# Patient Record
Sex: FEMALE | Race: AMERICAN INDIAN OR ALASKA NATIVE | NOT HISPANIC OR LATINO | URBAN - NONMETROPOLITAN AREA
[De-identification: names, ages, dates, MRNs, and addresses within clinical notes are randomized per-mention and may not be internally consistent; named-entity substitution may affect disease eponyms.]

---

## 2023-06-19 ENCOUNTER — OFFICE VISIT (OUTPATIENT)
Dept: FAMILY MEDICINE | Facility: OTHER | Age: 58
End: 2023-06-19
Payer: MEDICAID

## 2023-06-19 VITALS
HEART RATE: 71 BPM | TEMPERATURE: 97.9 F | HEIGHT: 66 IN | WEIGHT: 170.6 LBS | RESPIRATION RATE: 20 BRPM | SYSTOLIC BLOOD PRESSURE: 158 MMHG | BODY MASS INDEX: 27.42 KG/M2 | OXYGEN SATURATION: 98 % | DIASTOLIC BLOOD PRESSURE: 98 MMHG

## 2023-06-19 DIAGNOSIS — L03.115 CELLULITIS OF RIGHT LOWER EXTREMITY: Primary | ICD-10-CM

## 2023-06-19 PROCEDURE — 99203 OFFICE O/P NEW LOW 30 MIN: CPT | Performed by: NURSE PRACTITIONER

## 2023-06-19 PROCEDURE — G0463 HOSPITAL OUTPT CLINIC VISIT: HCPCS

## 2023-06-19 RX ORDER — SULFAMETHOXAZOLE/TRIMETHOPRIM 800-160 MG
1 TABLET ORAL 2 TIMES DAILY
Qty: 20 TABLET | Refills: 0 | Status: SHIPPED | OUTPATIENT
Start: 2023-06-19 | End: 2023-06-29

## 2023-06-19 ASSESSMENT — PAIN SCALES - GENERAL: PAINLEVEL: MODERATE PAIN (5)

## 2023-06-19 NOTE — NURSING NOTE
Patient is here for bug bite on right lower leg. Noticed Friday, has been getting worse since.     Elise Beverly LPN .............6/19/2023     11:25 AM

## 2023-06-19 NOTE — PATIENT INSTRUCTIONS
Bactrim twice daily for 10 days.     If you develop a fever, nausea with vomiting, feeling like the infection is generally worsening and not improving then I would recommend you be re-evaluated more urgently.

## 2023-06-19 NOTE — PROGRESS NOTES
ASSESSMENT/PLAN:  I have reviewed the nursing notes.  I have reviewed the findings, diagnosis, plan and need for follow up with the patient.    1. Cellulitis of right lower extremity  - sulfamethoxazole-trimethoprim (BACTRIM DS) 800-160 MG tablet; Take 1 tablet by mouth 2 times daily for 10 days  Dispense: 20 tablet; Refill: 0  To cover for possible MRSA at site of infection. Is currently at [facility].   - May use over-the-counter Tylenol or ibuprofen PRN    Discussed warning signs/symptoms indicative of need to f/u    Follow up if symptoms persist or worsen or concerns    I explained my diagnostic considerations and recommendations to the patient, who voiced understanding and agreement with the treatment plan. All questions were answered. We discussed potential side effects of any prescribed or recommended therapies, as well as expectations for response to treatments.    Mellisa Blevins NP  6/19/2023  11:31 AM    HPI:  Mariella Conway is a 58 year old female who presents to [facility] today for concerns of right lower leg bug bite, getting worse. This happened on Friday, the site was itchy, so she scratched at it. Did not remove a tick. Uncertain what got her. Possibly a spider bite. It is draining a bit of clear/orange drainage today. Has diabetes. Has not reacted like this before. The site is really sore/tender. Does not feel ill. No fevers. It is throbbing up her leg now too. Causing slight swelling as well.   Has not had a lot of primary care over the years.   She is at [facility] for alcohol abuse. Has been there since March 28th, this is her third treatment plan.     ROS otherwise negative.     No past medical history on file.  No past surgical history on file.  Social History     Tobacco Use     Smoking status: Every Day     Packs/day: 0.25     Types: Cigarettes     Passive exposure: Current     Smokeless tobacco: Never   Vaping Use     Vaping status: Some Days     Substances: Nicotine  "  Substance Use Topics     Alcohol use: Not on file     No current outpatient medications on file.     Allergies   Allergen Reactions     Acetaminophen Other (See Comments)     Liver     Butorphanol Headache     Codeine Headache     Meperidine Headache     Past medical history, past surgical history, current medications and allergies reviewed and accurate to the best of my knowledge.      ROS:  Refer to HPI    BP (!) 158/98   Pulse 71   Temp 97.9  F (36.6  C) (Tympanic)   Resp 20   Ht 1.676 m (5' 6\")   Wt 77.4 kg (170 lb 9.6 oz)   LMP  (LMP Unknown)   SpO2 98%   Breastfeeding No   BMI 27.54 kg/m      EXAM:  General Appearance: Well appearing 58 year old female, appropriate appearance for age. No acute distress   Respiratory: normal chest wall and respirations.  Normal effort.  Clear to auscultation bilaterally, no wheezing, crackles or rhonchi.  No increased work of breathing.  No cough appreciated.  Cardiac: RRR with no murmurs  Musculoskeletal:  Equal movement of bilateral upper extremities.  Equal movement of bilateral lower extremities.  Normal gait.    Dermatological: right lower extremity: erythematous, warmth, and tenderness surrounding insect bite site. There is small amount of serous drainage observed. Approximately 5 cm in diameter.   Psychological: normal affect, alert, oriented, and pleasant.         "

## 2023-06-21 ENCOUNTER — OFFICE VISIT (OUTPATIENT)
Dept: INTERNAL MEDICINE | Facility: OTHER | Age: 58
End: 2023-06-21
Payer: MEDICAID

## 2023-06-21 VITALS
WEIGHT: 168.13 LBS | HEART RATE: 64 BPM | TEMPERATURE: 97.2 F | SYSTOLIC BLOOD PRESSURE: 138 MMHG | RESPIRATION RATE: 20 BRPM | HEIGHT: 67 IN | DIASTOLIC BLOOD PRESSURE: 84 MMHG | OXYGEN SATURATION: 97 % | BODY MASS INDEX: 26.39 KG/M2

## 2023-06-21 DIAGNOSIS — L03.90 CELLULITIS, UNSPECIFIED CELLULITIS SITE: Primary | ICD-10-CM

## 2023-06-21 DIAGNOSIS — Z23 NEED FOR TDAP VACCINATION: ICD-10-CM

## 2023-06-21 DIAGNOSIS — G47.00 INSOMNIA, UNSPECIFIED TYPE: ICD-10-CM

## 2023-06-21 PROCEDURE — 87077 CULTURE AEROBIC IDENTIFY: CPT | Mod: ZL

## 2023-06-21 PROCEDURE — 99213 OFFICE O/P EST LOW 20 MIN: CPT

## 2023-06-21 PROCEDURE — 90715 TDAP VACCINE 7 YRS/> IM: CPT

## 2023-06-21 PROCEDURE — G0463 HOSPITAL OUTPT CLINIC VISIT: HCPCS

## 2023-06-21 PROCEDURE — 90471 IMMUNIZATION ADMIN: CPT

## 2023-06-21 PROCEDURE — 250N000013 HC RX MED GY IP 250 OP 250 PS 637

## 2023-06-21 PROCEDURE — G0463 HOSPITAL OUTPT CLINIC VISIT: HCPCS | Mod: 25

## 2023-06-21 RX ORDER — RAMELTEON 8 MG/1
TABLET, FILM COATED ORAL
COMMUNITY
Start: 2023-06-07

## 2023-06-21 RX ORDER — ACETAMINOPHEN 325 MG/1
TABLET ORAL
COMMUNITY
Start: 2022-10-27

## 2023-06-21 RX ORDER — ACETAMINOPHEN 500 MG
500 TABLET ORAL EVERY 4 HOURS PRN
Status: ACTIVE | OUTPATIENT
Start: 2023-06-21

## 2023-06-21 RX ORDER — TRAZODONE HYDROCHLORIDE 50 MG/1
50 TABLET, FILM COATED ORAL AT BEDTIME
Qty: 10 TABLET | Refills: 0 | Status: SHIPPED | OUTPATIENT
Start: 2023-06-21

## 2023-06-21 RX ORDER — MUPIROCIN 20 MG/G
OINTMENT TOPICAL 3 TIMES DAILY
Qty: 30 G | Refills: 0 | Status: SHIPPED | OUTPATIENT
Start: 2023-06-21

## 2023-06-21 RX ORDER — IBUPROFEN 800 MG/1
TABLET, FILM COATED ORAL
COMMUNITY
Start: 2023-05-30

## 2023-06-21 RX ADMIN — ACETAMINOPHEN 500 MG: 500 TABLET ORAL at 14:14

## 2023-06-21 ASSESSMENT — PAIN SCALES - GENERAL: PAINLEVEL: SEVERE PAIN (7)

## 2023-06-21 NOTE — PROGRESS NOTES
Assessment & Plan   Mariella Conway is a 58 year old presenting for the following health issues:      ICD-10-CM    1. Cellulitis, unspecified cellulitis site  L03.90 mupirocin (BACTROBAN) 2 % external ointment     Wound Aerobic Bacterial Culture Routine     acetaminophen (TYLENOL) tablet 500 mg     Wound Aerobic Bacterial Culture Routine      2. Need for Tdap vaccination  Z23 Tdap, tetanus-diptheria-acell pertussis, (BOOSTRIX) 5-2.5-18.5 LF-MCG/0.5 SUSP injection     TDAP 10-64Y (ADACEL,BOOSTRIX)     DISCONTINUED: Tdap, tetanus-diptheria-acell pertussis, (BOOSTRIX) 5-2.5-18.5 LF-MCG/0.5 SUSP injection      3. Insomnia, unspecified type  G47.00 traZODone (DESYREL) 50 MG tablet        Area of cellulitis seems unchanged from 2 days ago.  Encouraged her to continue Bactrim, I did collect a wound culture and will change antibiotic if indicated.  Instructed her to use Bactroban ointment topically 3 times daily, keep the area clean, elevate legs above level of heart.  Updated Tdap vaccine.    For her insomnia it appears that she has been on trazodone in the past, sent in new prescription for 50 mg tablet for her to take at bedtime as needed for insomnia.    No follow-ups on file.    SHEILA Voss [facility]      Subjective   Mariella is a 58 year old, presenting for the following health issues:    Patient presents to clinic for wound check right lower leg.  She noticed the wound 4 days ago suspecting a bug bite of some sort.  She was recently started on 10-day course of Bactrim on 6- with concerns of bug bite that was getting worse, diagnosed with cellulitis.  She was instructed to take Tylenol and ibuprofen as needed for pain.  She is currently residing at [facility] for alcohol abuse, has been there since March 28 this is her third treatment plan.    Patient has not quite been treated with Bactrim for 48 hours.  States that the area is unchanged however continues  "to be very painful.  Denies any fevers, lower leg swelling, extending erythema or purulent drainage.  Would like something stronger for pain, she also reports that she has had extreme difficulty with insomnia since being in the recovery center would like a sleep aid of some sort that she can use.    Wound Check (Right lower leg open sore. Area has increased in size, throbbing and drainage. Was seen in  two days ago.   Adriana Prieto LPN on 6/21/2023 at 1:18 PM//)    Wound Check    History of Present Illness       Reason for visit:  Recheck right lower leg wound    She eats 2-3 servings of fruits and vegetables daily.She consumes 3 sweetened beverage(s) daily.She exercises with enough effort to increase her heart rate 9 or less minutes per day.  She exercises with enough effort to increase her heart rate 3 or less days per week.   She is taking medications regularly.       Skin Lesion  Onset/Duration: 4 days  Description  Location: right lower leg  Color: brown and white  Border description: red  Character: Sloughing  Itching: mild  Bleeding:  No  Intensity:  moderate  Progression of Symptoms:  worsening  Accompanying signs and symptoms:   Bleeding: YES  Scaling: No  Excessive sun exposure/tanning: No  Sunscreen used: No  History:           Any previous history of skin cancer: No  Any family history of melanoma: No  Previous episodes of similar lesion: No  Precipitating or alleviating factors: Having legs dependent for long periods of time has made pain worse  Therapies tried and outcome: none          Review of Systems         Objective    /84   Pulse 64   Temp 97.2  F (36.2  C)   Resp 20   Ht 1.702 m (5' 7\")   Wt 76.3 kg (168 lb 2 oz)   LMP  (LMP Unknown)   SpO2 97%   BMI 26.33 kg/m    Body mass index is 26.33 kg/m .  Physical Exam  Vitals reviewed.   Constitutional:       General: She is not in acute distress.     Appearance: Normal appearance. She is not toxic-appearing.   Skin:     Findings: " Wound present.      Comments: Approximate size 2 inch x 2 inch circular wound on right lower calf.  No extending erythema, sloughing of skin noted, no purulent drainage   Neurological:      Mental Status: She is alert.

## 2023-06-21 NOTE — PATIENT INSTRUCTIONS
Continue on Bactrim twice a day until antibiotic is completed.  Start Bactroban ointment 3 times daily on wound.  Wound culture was collected today we will contact you if we need to change antibiotic course.  Come back if symptoms worsen or do not improve.  Tetanus vaccine updated today

## 2023-06-24 DIAGNOSIS — L03.90 CELLULITIS, UNSPECIFIED CELLULITIS SITE: Primary | ICD-10-CM

## 2023-06-24 LAB — BACTERIA WND CULT: ABNORMAL

## 2023-06-24 RX ORDER — CEPHALEXIN 500 MG/1
500 CAPSULE ORAL 2 TIMES DAILY
Qty: 14 CAPSULE | Refills: 0 | Status: SHIPPED | OUTPATIENT
Start: 2023-06-24 | End: 2023-07-01

## 2023-06-25 ENCOUNTER — TELEPHONE (OUTPATIENT)
Dept: FAMILY MEDICINE | Facility: OTHER | Age: 58
End: 2023-06-25
Payer: MEDICAID

## 2023-06-25 NOTE — TELEPHONE ENCOUNTER
Should discontinue the Bactrim and it is okay to start the new antibiotic tomorrow. SHEILA Adames, SILVIA  ....................  6/25/2023   1:27 PM

## 2023-06-25 NOTE — TELEPHONE ENCOUNTER
"Received call re: recent antibiotic change. Message was as follows:    \"Please call patient or [facility] recovery staff and let her know that Group A strep grew from wound culture- will need to stop bactrim and start keflex twice a day for 7 days (if symptoms are still persisting). [facility]\"    Staff and patient were notified. They are wondering, they are not able to get Keflex until tomorrow (patient did have her am dose of Bactrim today). Is this ok to start the Keflex tomorrow am? If so, still continue the Bactrim for tonight, or completely discontinue until able to get the Keflex? Did ask about transferring rx to [facility] in [facility] for today, but this will not work.     Please advise.   Lorraine Rodriguez LPN...................6/25/2023   12:38 PM       "

## 2023-11-22 ENCOUNTER — CARE COORDINATION (OUTPATIENT)
Dept: CARE COORDINATION | Facility: CLINIC | Age: 58
End: 2023-11-22
Payer: MEDICAID

## 2023-11-22 NOTE — PROGRESS NOTES
Clinic Care Coordination Contact  [facility]/Voicemail    Patient was referred to Care Coordination at [facility] through the [place] [employer]ization [employer]. I have attempted to contact patient using their phone numbers listed in their chart. The home phone number was invalid and the mobile phone number was no longer in service. I attempted to contact patients mother as she is a listed contact for patient but her number is no longer in service. It appears that patient is receiving mental health services through [facility] and has care coordination through [facility] already in place.     No further needs from [facility] Care Coordination at this time.     CLARISA Barragan on 11/22/2023 at 11:47 AM

## (undated) RX ORDER — ACETAMINOPHEN 500 MG
TABLET ORAL
Status: DISPENSED
Start: 2023-06-21